# Patient Record
Sex: MALE | Race: WHITE
[De-identification: names, ages, dates, MRNs, and addresses within clinical notes are randomized per-mention and may not be internally consistent; named-entity substitution may affect disease eponyms.]

---

## 2020-03-21 ENCOUNTER — HOSPITAL ENCOUNTER (OUTPATIENT)
Dept: HOSPITAL 25 - ED | Age: 50
Setting detail: OBSERVATION
LOS: 1 days | Discharge: HOME | End: 2020-03-22
Attending: HOSPITALIST | Admitting: HOSPITALIST
Payer: MEDICARE

## 2020-03-21 DIAGNOSIS — R51: ICD-10-CM

## 2020-03-21 DIAGNOSIS — K92.2: Primary | ICD-10-CM

## 2020-03-21 DIAGNOSIS — Z86.010: ICD-10-CM

## 2020-03-21 DIAGNOSIS — N18.3: ICD-10-CM

## 2020-03-21 DIAGNOSIS — G80.9: ICD-10-CM

## 2020-03-21 DIAGNOSIS — R55: ICD-10-CM

## 2020-03-21 DIAGNOSIS — I12.9: ICD-10-CM

## 2020-03-21 DIAGNOSIS — Z88.8: ICD-10-CM

## 2020-03-21 LAB
ALBUMIN SERPL BCG-MCNC: 3.4 G/DL (ref 3.2–5.2)
ALBUMIN/GLOB SERPL: 1.3 {RATIO} (ref 1–3)
ALP SERPL-CCNC: 69 U/L (ref 34–104)
ALT SERPL W P-5'-P-CCNC: 15 U/L (ref 7–52)
ANION GAP SERPL CALC-SCNC: 4 MMOL/L (ref 2–11)
APTT PPP: 28 SECONDS (ref 26–38)
AST SERPL-CCNC: 12 U/L (ref 13–39)
BASOPHILS # BLD AUTO: 0 10^3/UL (ref 0–0.2)
BUN SERPL-MCNC: 32 MG/DL (ref 6–24)
BUN/CREAT SERPL: 19.6 (ref 8–20)
CALCIUM SERPL-MCNC: 8.1 MG/DL (ref 8.6–10.3)
CHLORIDE SERPL-SCNC: 109 MMOL/L (ref 101–111)
EOSINOPHIL # BLD AUTO: 0 10^3/UL (ref 0–0.6)
FOLATE SERPL-MCNC: 11.19 NG/ML (ref 3.99–?)
GLOBULIN SER CALC-MCNC: 2.7 G/DL (ref 2–4)
GLUCOSE SERPL-MCNC: 130 MG/DL (ref 70–100)
HCO3 SERPL-SCNC: 25 MMOL/L (ref 22–32)
HCT VFR BLD AUTO: 32 % (ref 42–52)
HCT VFR BLD AUTO: 34 % (ref 42–52)
HCT VFR BLD AUTO: 34 % (ref 42–52)
HGB BLD-MCNC: 11.1 G/DL (ref 14–18)
HGB BLD-MCNC: 11.3 G/DL (ref 14–18)
INR PPP/BLD: 1.1 (ref 0.82–1.09)
LYMPHOCYTES # BLD AUTO: 0.9 10^3/UL (ref 1–4.8)
MCH RBC QN AUTO: 32 PG (ref 27–31)
MCHC RBC AUTO-ENTMCNC: 34 G/DL (ref 31–36)
MCV RBC AUTO: 95 FL (ref 80–94)
MONOCYTES # BLD AUTO: 0.4 10^3/UL (ref 0–0.8)
NEUTROPHILS # BLD AUTO: 5.2 10^3/UL (ref 1.5–7.7)
NRBC # BLD AUTO: 0 10^3/UL
NRBC BLD QL AUTO: 0
PLATELET # BLD AUTO: 178 10^3/UL (ref 150–450)
POTASSIUM SERPL-SCNC: 3.9 MMOL/L (ref 3.5–5)
PROT SERPL-MCNC: 6.1 G/DL (ref 6.4–8.9)
RBC # BLD AUTO: 3.54 10^6 /UL (ref 4.18–5.48)
RBC # BLD AUTO: 3.55 10^6/UL (ref 4.18–5.48)
RETICULOCYTE PRODUCTION INDEX: 0.5 %
RETICULOCYTE PRODUCTION INDEX: 0.8 % (ref 0.5–1.5)
SODIUM SERPL-SCNC: 138 MMOL/L (ref 135–145)
WBC # BLD AUTO: 6.5 10^3/UL (ref 3.5–10.8)

## 2020-03-21 PROCEDURE — 80053 COMPREHEN METABOLIC PANEL: CPT

## 2020-03-21 PROCEDURE — 86850 RBC ANTIBODY SCREEN: CPT

## 2020-03-21 PROCEDURE — 85014 HEMATOCRIT: CPT

## 2020-03-21 PROCEDURE — 99157 MOD SED OTHER PHYS/QHP EA: CPT

## 2020-03-21 PROCEDURE — 93005 ELECTROCARDIOGRAM TRACING: CPT

## 2020-03-21 PROCEDURE — 99156 MOD SED OTH PHYS/QHP 5/>YRS: CPT

## 2020-03-21 PROCEDURE — 82746 ASSAY OF FOLIC ACID SERUM: CPT

## 2020-03-21 PROCEDURE — 85610 PROTHROMBIN TIME: CPT

## 2020-03-21 PROCEDURE — 82607 VITAMIN B-12: CPT

## 2020-03-21 PROCEDURE — 85018 HEMOGLOBIN: CPT

## 2020-03-21 PROCEDURE — 36415 COLL VENOUS BLD VENIPUNCTURE: CPT

## 2020-03-21 PROCEDURE — 86901 BLOOD TYPING SEROLOGIC RH(D): CPT

## 2020-03-21 PROCEDURE — 96372 THER/PROPH/DIAG INJ SC/IM: CPT

## 2020-03-21 PROCEDURE — 80048 BASIC METABOLIC PNL TOTAL CA: CPT

## 2020-03-21 PROCEDURE — 85730 THROMBOPLASTIN TIME PARTIAL: CPT

## 2020-03-21 PROCEDURE — 88305 TISSUE EXAM BY PATHOLOGIST: CPT

## 2020-03-21 PROCEDURE — 85045 AUTOMATED RETICULOCYTE COUNT: CPT

## 2020-03-21 PROCEDURE — 99285 EMERGENCY DEPT VISIT HI MDM: CPT

## 2020-03-21 PROCEDURE — 86900 BLOOD TYPING SEROLOGIC ABO: CPT

## 2020-03-21 PROCEDURE — G0378 HOSPITAL OBSERVATION PER HR: HCPCS

## 2020-03-21 PROCEDURE — 82270 OCCULT BLOOD FECES: CPT

## 2020-03-21 PROCEDURE — 85025 COMPLETE CBC W/AUTO DIFF WBC: CPT

## 2020-03-21 PROCEDURE — 96374 THER/PROPH/DIAG INJ IV PUSH: CPT

## 2020-03-21 RX ADMIN — CYANOCOBALAMIN TAB 500 MCG SCH MCG: 500 TAB at 12:58

## 2020-03-21 RX ADMIN — CARBAMAZEPINE SCH MG: 200 TABLET ORAL at 20:07

## 2020-03-21 RX ADMIN — CARBAMAZEPINE SCH MG: 200 TABLET ORAL at 10:38

## 2020-03-21 RX ADMIN — VERAPAMIL HYDROCHLORIDE SCH MG: 120 TABLET ORAL at 10:38

## 2020-03-21 NOTE — HP
CC:  Radha Lamb NP; Dr. Aakash Galloway; Dr. Kelvin Soriano *

 

ADMISSION HISTORY AND PHYSICAL:

 

DATE OF ADMISSION:  20

 

PRIMARY CARE PROVIDER:  Radha Lamb NP.

 

MY ATTENDING WHILE IN THE HOSPITAL:  Dr. Subha Maier.* (DICTATED BY FLORENCIO LIZAMA)

 

CONSULTING GASTROENTEROLOGIST:  Dr. Aakash Galloway.

 

GASTROENTEROLOGIST AT [facility], [place]:  Dr. Kelvin Soriano.

 

CHIEF COMPLAINT:  Gastrointestinal bleeding x4, up from 6 hours.

 

HISTORY OF PRESENT ILLNESS:  Mr. Vaughan is a 50-year-old male with past 
medical history significant for chronic kidney disease, cerebral palsy, 
hypertension, and headaches, who presents to the emergency department on  after he had a colonoscopy at which time a 15-mm polyp was removed from his 
ascending colon and 3 other moderate-sized polyps removed from his transverse 
colon.  The patient had no complications with the procedure, no signs of 
bleeding at that time.  This is the patient's first colonoscopy and was done 
for routine screening, not for any symptoms.  The patient has no history of 
premature colon cancer in his family.  The patient has not been otherwise 
feeling unwell.  The patient has had no history of GI bleeding.  He has had no 
history of weight loss.  The patient has taken no NSAIDs due to his kidneys, no 
other blood thinners.  The patient on Thursday, 20, had several small 
blood clots in his stool but did not think anything of this.  



However, this morning, on 20, the patient woke up at 4:30 in the morning 
with fecal urgency, went to the bathroom and had a large bloody bowel movement 
with anabella red blood and clots, after which he stood up and passed out, hitting 
his head on the wall.  He did not know how long he was out, but he stood up 
again and passed out again.  After the second time he passed out, he called his 
wife to activate EMS and was brought to the hospital emergently.  In the 
emergency department, the patient was found to be slightly anemic with a normal 
blood pressure and normal heart rate.  The patient had stool occult blood which 
was positive.  The patient's case was discussed with Dr. Aakash Galloway, 
Gastroenterology, who believes it is delayed postpolypectomy bleed, and we were 
asked to evaluate the patient for admission to the hospital.

 

PAST MEDICAL HISTORY:  Cerebral palsy, chronic headaches, hypertension, chronic 
kidney disease stage 3, recent diagnosis of colon polyps.

 

PAST SURGICAL HISTORY:  Left leg lengthening, right knee surgery after a trauma
, tonsillectomy.

 

MEDICATIONS:

1.  Tegretol 100 mg p.o. b.i.d.

2.  Losartan 50 mg p.o. daily.

3.  Verapamil 240 mg p.o. daily.

 

ALLERGIES:  LISINOPRIL.

 

FAMILY HISTORY:  The patient's father is alive and has diabetes and 
hypertension. The patient's mother  of renal failure.  The patient has a 
brother who is alive and well.

 

SOCIAL HISTORY:  The patient never smoked.  The patient drinks approximately 3 
times a year.  The patient used marijuana to manage his headaches but no longer 
does.  The patient works as a  at [employer] in [place].  The 
patient is  and has 1 biological child who is alive and 1 biological 
child who  at birth and 3 stepchildren.

 

REVIEW OF SYSTEMS:  A 10-point review of systems was reviewed, is negative 
except as above in the HPI and being positive for recent intentional weight 
loss on a ketogenic diet.

 

                               PHYSICAL EXAMINATION

 

GENERAL:  The patient is a 50-year-old male who appears stated age, sitting 
comfortably in the bed, in no acute distress.

 

VITAL SIGNS:  At the time of evaluation, temperature 97.4, pulse rate 71, 
respiratory rate 18, oxygen saturation 100% on room air, blood pressure 118/78.

 

HEENT:  Head normocephalic, atraumatic.  Sclerae anicteric.  No conjunctival 
injection.  Nasal mucosa moist.  Oral mucosa moist.  No pharyngeal erythema, 
discharge, or exudate.

 

NECK:  Supple, nontender.  No lymphadenopathy.  No carotid bruits.  No JVD.

 

RESPIRATORY:  Clear to auscultation bilaterally.  No wheezes, rales, or 
rhonchi. Good air exchange bilaterally.

 

CARDIAC:  Regular rate and rhythm.  No clicks, murmurs, gallops, or rubs.  
Pulses 2+ in the bilateral dorsalis pedis, posterior tibialis, and radial areas.

 

ABDOMEN:  Soft, nontender, nondistended.  Bowel sounds present, normoactive in 
all 4 quadrants.  No hepatosplenomegaly.  No abdominal bruits auscultated.  No 
hepatojugular reflux.

 

GENITOURINARY:  No suprapubic or CVA tenderness.

 

NEUROLOGIC:  Weakness and atrophy of the left lower extremity.

 

SKIN:  Clean, dry, and intact.  No rashes.

 

 DIAGNOSTIC STUDIES/LAB DATA:  White blood cell count 6.5, hemoglobin 11.3, 
platelet count 178.  INR 1.10, APTT 28.  Sodium 130, potassium 2.9, chloride 109
, carbon dioxide 25, anion gap 4, BUN 32, creatinine 1.63, glucose 130, calcium 
9.1. Bilirubin 0.3, AST 12, ALT 15, alkaline phosphatase 69.  Protein 6.1, 
albumin 3.4, globulin 2.7.

 

EKG shows normal sinus rhythm.  No ST segment elevation or depression.  No 
hypertrophy or enlargement.

 

ASSESSMENT AND PLAN:  Impression:  Mr. Vaughan is a 50-year-old male with past 
medical history significant for cerebral palsy, hypertension, chronic kidney 
disease, who presents to the emergency department 10 days after his colonoscopy 
with a large amount of bright red blood per rectum concerning for delayed 
postpolypectomy bleed.  The patient will be admitted to the hospital for close 
hemodynamic monitoring as well as gastroenterology consult and likely repeat 
colonoscopy.

 

1.  Lower gastrointestinal bleed likely delayed polypectomy bleed.  The patient 
had a 1.5 cm polyp removed from his ascending colon 10 days ago with tissue 
resection as well.  This is likely the cause of his bleeding.  The patient is 
on blood thinners.  The patient does not need transfusion at this time as his 
hemoglobin is 11.3, and he is not markedly hypotensive.  The patient will be 
monitored closely with serial H and H's and be transfused under 7.  The patient 
had 2 episodes of syncope this morning.  These were without any bowel movements 
and with acute blood loss.  The patient is on verapamil and losartan.  This is 
unlikely to be related to the patient's acute blood loss.  The patient was 
given a dose of pantoprazole while in the emergency department.  This will not 
be continued.  The patient will be prepped with 4 L GoLYTELY, 1 this evening 
and 1 tomorrow morning.  Further recommendation per Gastroenterology, Dr. Aakash Galloway, who will see the patient in consultation and do a colonoscopy tomorrow 
morning.

2.  Syncope.  The patient's syncope has no concerning findings for cardiogenic 
syncope nor any syncope mimics.  The patient likely had an enhanced vagal 
response from his relatively high dose of verapamil and his losartan.  These 
medications will be held.  The patient has been getting fluids.  The patient 
will be transfused as above if indicated.  The patient's EKG is not ischemic, 
no further assessment of his syncope is indicated at this time.

3.  Chronic kidney disease.  We have no history of the patient's creatinine 
except his statement that he has chronic kidney disease stage 3.  This very 
well could be his baseline.  We will hold his losartan.  The patient did not 
have an elevated BUN to creatinine ratio nor a significantly elevated BUN.  It 
is very unlikely this is related to an upper gastrointestinal bleed.

4.  History of palsy.  Supportive care.  The patient is very functional.

5.  Hypertension.  Hold losartan and reduce verapamil as above.

6.  DVT prophylaxis:  SCD for gastrointestinal bleeding and low risk.

7.  FEN:  The patient will have a clear liquid diet.  No further fluids.

 

TIME SPENT:  Approximately 60 minutes was spent on admission of this patient, 
30 of which was spent face-to-face with the patient, obtaining history and 
physical and discussing treatment plan.

 

The plan was discussed with my attending Dr. Subha Maier, and she is in 
agreement.

 

 ____________________________________ FLORENCIO LIZAMA

 

[id number] #: [id number]

[id number]

## 2020-03-21 NOTE — ED
GI/ HPI





- HPI Summary


HPI Summary: 





This patient is a 49yo M presenting to the ED with GI bleed since Thursday.  

States he had a colonoscopy on March 11 in [facility].  This was a 

scheduled colon cancer screening colonoscopy and pt had no symptoms prior to 

this.   Denied any symptoms until Thursday, March 19 in which he had BRBPR with 

stool during a BM.  He also noticed this the following day, Friday, but states 

it was much less.  Denied any pain at the anus or abdominal pain.  Denied any n/

v.  This AM, patient states there was a large amount of some black stool but 

mostly bright red blood with a very small bowel movement this morning.  While 

sitting on the toilet, patient had a syncopal episode and struck the L side of 

his head.  He is unsure how long he had LOC.  Immediately following getting up, 

he had another syncopal episode and called EMS.  States he feels well now.  








Per patient, he had a few polyps removed, but otherwise colonoscopy was 

unremarkable with no complications.  





During syncopal episodes, pt denied diaphoresis, n/v, abd pain or visual 

changes.  No hx of syncope.  


Hx includes HTN and migraines for which he takes losartan and carbamazepime.  





Denies HA currently.  Denies photophobia, visual changes.  Denies any blood 

thinners. 











- History of Current Complaint


Chief Complaint: EDGIBleed


Time Seen by Provider: 03/21/20 06:44


Stated Complaint: RECTAL BLEED PER EMS


Hx Obtained From: Patient


Timing: Constant


Severity: Mild


Current Severity: Mild


Pain Intensity: 0


Associated Signs and Symptoms: Positive: Bright Red Blood w/Stool.  Negative: 

Hematemesis, Back Pain, Pallor, Dizziness, External Hemorrhoid, Lightheadedness

, Cough, Chest Pain


Aggravating Factor(s): Nothing


Alleviating Factor(s): Nothing





- Allergy/Home Medications


Allergies/Adverse Reactions: 


 Allergies











Allergy/AdvReac Type Severity Reaction Status Date / Time


 


lisinopril Allergy  Coughing Verified 03/21/20 06:45











Home Medications: 


 Home Medications





Losartan Potassium [Cozaar] 50 mg PO DAILY 03/21/20 [History Confirmed 03/21/20]


Verapamil TAB* [Calan TAB*] 240 mg PO BID 03/21/20 [History Confirmed 03/21/20]


carBAMazepine TAB(*) [TEGretol TAB(*)] 100 mg PO BID 03/21/20 [History 

Confirmed 03/21/20]











PMH/Surg Hx/FS Hx/Imm Hx


Previously Healthy: Yes





- Immunization History


Hx Pertussis Vaccination: No


Immunizations Up to Date: Yes


Infectious Disease History: No


Infectious Disease History: 


   Denies: Traveled Outside the [place] in Last 30 Days





- Social History


Occupation: Employed Full-time


Lives: With Family


Alcohol Use: Rare


Hx Substance Use: No


Substance Use Type: Reports: None


Smoking Status (MU): Never Smoked Tobacco





Review of Systems


Negative: Fever, Chills, Fatigue, Skin Diaphoresis


Negative: Palpitations, Chest Pain


Negative: Shortness Of Breath, Cough


Genitourinary: Negative


Positive: no symptoms reported, see HPI


All Other Systems Reviewed And Are Negative: Yes





Physical Exam


Triage Information Reviewed: Yes


Vital Signs On Initial Exam: 


 Initial Vitals











Resp BP


 


 17   112/78 


 


 03/21/20 06:35  03/21/20 06:35











Vital Signs Reviewed: Yes


Appearance: Positive: Well-Appearing, Well-Nourished


Skin: Positive: Warm, Skin Color Reflects Adequate Perfusion


Head/Face: Positive: Normal Head/Face Inspection


Eyes: Positive: EOMI, YASMIN, Conjunctiva Clear


Neck: Positive: Supple, No Lymphadenopathy


Respiratory/Lung Sounds: Positive: Clear to Auscultation, Breath Sounds Present


Cardiovascular: Positive: RRR, Pulses are Symmetrical in both Upper and Lower 

Extremities


Musculoskeletal: Positive: Strength/ROM Intact


Neurological: Positive: Sensory/Motor Intact


Psychiatric: Positive: Affect/Mood Appropriate


AVPU Assessment: Alert





Procedures





- Sedation


Patient Received Moderate/Deep Sedation with Procedure: No





Diagnostics





- Vital Signs


 Vital Signs











  Temp Pulse Resp BP Pulse Ox


 


 03/21/20 08:05   62  14  119/85  99


 


 03/21/20 08:00   69  18   100


 


 03/21/20 07:35   64  2  113/76  99


 


 03/21/20 07:05   59  17  101/76  94


 


 03/21/20 07:00    0  


 


 03/21/20 06:51    6  


 


 03/21/20 06:40  97.4 F  62  18  112/78  96


 


 03/21/20 06:35    17  112/78 














- Laboratory


Lab Results: 


 Lab Results











  03/21/20 03/21/20 03/21/20 Range/Units





  07:02 07:02 07:02 


 


WBC  6.5    (3.5-10.8)  10^3/uL


 


RBC  3.54 L    (4.18-5.48)  10^6 /uL


 


Hgb  11.3 L    (14.0-18.0)  g/dL


 


Hct  34 L    (42-52)  %


 


MCV  95 H    (80-94)  fL


 


MCH  32 H    (27-31)  pg


 


MCHC  34    (31-36)  g/dL


 


RDW  15    (10-15)  %


 


Plt Count  178    (150-450)  10^3/uL


 


MPV  7.8    (7.4-10.4)  fL


 


Neut % (Auto)  78.8    %


 


Lymph % (Auto)  13.4    %


 


Mono % (Auto)  6.4    %


 


Eos % (Auto)  0.8    %


 


Baso % (Auto)  0.6    %


 


Absolute Neuts (auto)  5.2    (1.5-7.7)  10^3/ul


 


Absolute Lymphs (auto)  0.9 L    (1.0-4.8)  10^3/ul


 


Absolute Monos (auto)  0.4    (0-0.8)  10^3/ul


 


Absolute Eos (auto)  0.0    (0-0.6)  10^3/ul


 


Absolute Basos (auto)  0.0    (0-0.2)  10^3/ul


 


Absolute Nucleated RBC  0.0    10^3/ul


 


Nucleated RBC %  0.0    


 


INR (Anticoag Therapy)   1.10 H   (0.82-1.09)  


 


APTT   28.0   (26.0-38.0)  seconds


 


Sodium    138  (135-145)  mmol/L


 


Potassium    3.9  (3.5-5.0)  mmol/L


 


Chloride    109  (101-111)  mmol/L


 


Carbon Dioxide    25  (22-32)  mmol/L


 


Anion Gap    4  (2-11)  mmol/L


 


BUN    32 H  (6-24)  mg/dL


 


Creatinine    1.63 H  (0.67-1.17)  mg/dL


 


Est GFR ( Amer)    54.5  (>60)  


 


Est GFR (Non-Af Amer)    45.0  (>60)  


 


BUN/Creatinine Ratio    19.6  (8-20)  


 


Glucose    130 H  ()  mg/dL


 


Calcium    8.1 L  (8.6-10.3)  mg/dL


 


Total Bilirubin    0.30  (0.2-1.0)  mg/dL


 


AST    12 L  (13-39)  U/L


 


ALT    15  (7-52)  U/L


 


Alkaline Phosphatase    69  ()  U/L


 


Total Protein    6.1 L  (6.4-8.9)  g/dL


 


Albumin    3.4  (3.2-5.2)  g/dL


 


Globulin    2.7  (2-4)  g/dL


 


Albumin/Globulin Ratio    1.3  (1-3)  


 


Blood Type     


 


Antibody Screen     














  03/21/20 Range/Units





  07:02 


 


WBC   (3.5-10.8)  10^3/uL


 


RBC   (4.18-5.48)  10^6 /uL


 


Hgb   (14.0-18.0)  g/dL


 


Hct   (42-52)  %


 


MCV   (80-94)  fL


 


MCH   (27-31)  pg


 


MCHC   (31-36)  g/dL


 


RDW   (10-15)  %


 


Plt Count   (150-450)  10^3/uL


 


MPV   (7.4-10.4)  fL


 


Neut % (Auto)   %


 


Lymph % (Auto)   %


 


Mono % (Auto)   %


 


Eos % (Auto)   %


 


Baso % (Auto)   %


 


Absolute Neuts (auto)   (1.5-7.7)  10^3/ul


 


Absolute Lymphs (auto)   (1.0-4.8)  10^3/ul


 


Absolute Monos (auto)   (0-0.8)  10^3/ul


 


Absolute Eos (auto)   (0-0.6)  10^3/ul


 


Absolute Basos (auto)   (0-0.2)  10^3/ul


 


Absolute Nucleated RBC   10^3/ul


 


Nucleated RBC %   


 


INR (Anticoag Therapy)   (0.82-1.09)  


 


APTT   (26.0-38.0)  seconds


 


Sodium   (135-145)  mmol/L


 


Potassium   (3.5-5.0)  mmol/L


 


Chloride   (101-111)  mmol/L


 


Carbon Dioxide   (22-32)  mmol/L


 


Anion Gap   (2-11)  mmol/L


 


BUN   (6-24)  mg/dL


 


Creatinine   (0.67-1.17)  mg/dL


 


Est GFR ( Amer)   (>60)  


 


Est GFR (Non-Af Amer)   (>60)  


 


BUN/Creatinine Ratio   (8-20)  


 


Glucose   ()  mg/dL


 


Calcium   (8.6-10.3)  mg/dL


 


Total Bilirubin   (0.2-1.0)  mg/dL


 


AST   (13-39)  U/L


 


ALT   (7-52)  U/L


 


Alkaline Phosphatase   ()  U/L


 


Total Protein   (6.4-8.9)  g/dL


 


Albumin   (3.2-5.2)  g/dL


 


Globulin   (2-4)  g/dL


 


Albumin/Globulin Ratio   (1-3)  


 


Blood Type  B Positive  


 


Antibody Screen  Negative  











Result Diagrams: 


 03/21/20 07:02





 03/21/20 07:02


Lab Statement: Any lab studies that have been ordered have been reviewed, and 

results considered in the medical decision making process.





GIGU Course/Dx





- Course


Course Of Treatment: On physical examination, patient appears well.  He is 

nondiaphoretic and does not appear to be pale.  He denies any pain at this 

time.  Lungs CTA, RRR.  Patient states he does not feel dizzy or lightheaded at 

this time, however had 2 syncopal episodes this morning.   Labs obtained: H&H 

shows 11.3 and 34.  UDAY: large amount of BRB with blackened stool near anus 

without evidence of hemorrhoids.  Stool occult sent.  Requested information 

from GI Dr. Sherman at [facility].  Discussed case with Dr. Galloway.  

Patient given fluids and protonix.  Likely delayed postpolypectomy bleeding.  

Will assess further.   Dr. Galloway will see patient and consult.  Pt 

hemodynamically stable.





- Diagnoses


Provider Diagnoses: 


 GI bleed








Discharge ED





- Sign-Out/Discharge


Documenting (check all that apply): Patient Departure





- Discharge Plan


Condition: Fair


Disposition: ADMITTED TO [facility]


Referrals: 


No Primary Care Phys,NOPCP [Primary Care Provider] - 





- Billing Disposition and Condition


Condition: FAIR


Disposition: Admitted to [facility]

## 2020-03-21 NOTE — CONS
CONSULTATION REPORT:

 

DATE OF CONSULT:  03/21/20

 

REQUESTING PROVIDER:  FLORENCIO Thomas.

 

INDICATION FOR CONSULTATION:  Rectal bleeding.

 

HISTORY OF PRESENT ILLNESS:  This is a very pleasant 50-year-old male with a 
past medical history of chronic headaches, who presented to the emergency room 
with rectal bleeding.  He did have a colonoscopy done at [facility] in [place] on 03
/11/20. He states he felt well after the procedure.  Denies any bleeding.  A 
review of the report does show that he had a 1.5 cm polyp in the ascending 
colon that was removed with electrocautery.  Clips were placed over this, and 3 
smaller 6 to 9 mm polyps were removed in the transverse colon, ascending colon, 
and cecum.  These were also removed with hot snare polypectomy.  The diminutive 
polyp was removed in the transverse colon with jumbo cold biopsy forceps.  For 
the next few days, he did well after his procedure.  He states that on Thursday 
he had several small clots in his stool and this continued for the next few days
, but on the morning of 03/21/20, he woke up at 4 and had a very large dark 
brown maroon colored stool with anabella blood.  He states that he nearly passed 
out while in the restroom and afterwards his wife called EMS and he was brought 
to the emergency room for further evaluation.  He currently denies any 
abdominal pain.  He has not had a bowel movement since being admitted to the 
floor here.  He denies any black in the stool. Denies any weight loss or weight 
gain.  Denies any dysphagia or odynophagia. Denies any nausea or emesis.  He 
denies any complications with anesthesia from the previous endoscopy.  The 
patient was admitted for evaluation of a postpolypectomy bleed.  The remainder 
of the 14-point review of systems grossly negative except for as described in 
the HPI.

 

PAST MEDICAL HISTORY:  Chronic kidney disease, he avoids NSAIDs because of this
; chronic headaches; hypertension; cerebral palsy; and aforementioned 
colonoscopy.

 

PAST SURGICAL HISTORY:  Knee surgery, tonsillectomy, colonoscopy on 03/11/20 by 
Dr. Kelvin Soriano in [place].

 

MEDICATIONS:  Home medications include:

1.  Tegretol.

2.  Losartan.

3.  Verapamil.

 

ALLERGIES:  LISINOPRIL.

 

FAMILY HISTORY:  Denies any family history of GI cancer or inflammatory bowel 
disease.

 

SOCIAL HISTORY:  Nonsmoker.  Rare alcohol use.

 

REVIEW OF SYSTEMS:  Remainder of the 14-point review of systems is grossly 
negative except for as described in the HPI.

 

PHYSICAL EXAM:  Vital Signs:  Blood pressure is 138/90, pulse is 91, 
respiratory rate is 18, temperature max is 97.8, he is 100% on room air.  In 
general, alert and oriented x3, in no acute distress.  HEENT:  Atraumatic, 
normocephalic.  Pupils equal, round, reactive to light.  Extraocular movement 
intact.  Conjunctivae are pink.  Sclerae anicteric.  Cardiovascular:  Regular 
rate and rhythm.  S1, S2. Respiratory:  Clear to auscultation bilaterally.  
Abdomen:  Obese, soft, nontender, nondistended.  Bowel sounds positive. No 
guarding or rebound.  Extremities:  No clubbing, no cyanosis, no edema.  Psych:
  Appropriate mood and affect.  Skin:  A few tattoos.

 

DIAGNOSTIC STUDIES/LAB DATA:  Hemoglobin 11.3, I do not have a previous 
baseline. INR 1.10.  BUN 32, creatinine 1.63.  AST is 12, ALT is 15, bilirubin 
is 0.30.

 

ASSESSMENT:  A 50-year-old male with acute blood loss anemia secondary to 
probable postpolypectomy bleed.

 

1.  Acute blood loss anemia.  Monitor H and H per primary team.  Discussed the 
risks, benefits, and alternatives with the risks not limited to perforation, 
surgery, missed lesions, and/or death with the patient and we will plan for 
colonoscopy evaluation with hemostasis on 03/22/20.  He is currently 
hemodynamically stable.  Continue to avoid NSAIDs on a long-term basis.

2.  History of colon polyps with recent polypectomy.  I reviewed the previous 
report.  Multiple polyps were taken off with hot snare polypectomy, which can 
lead to a delayed onset of bleeding.  We will plan on colonoscopy to evaluate.

 

 [id number]/CPS #: [id number]

[facility]

## 2020-03-22 VITALS — DIASTOLIC BLOOD PRESSURE: 83 MMHG | SYSTOLIC BLOOD PRESSURE: 141 MMHG

## 2020-03-22 LAB
ANION GAP SERPL CALC-SCNC: 4 MMOL/L (ref 2–11)
BASOPHILS # BLD AUTO: 0 10^3/UL (ref 0–0.2)
BUN SERPL-MCNC: 16 MG/DL (ref 6–24)
BUN/CREAT SERPL: 10.1 (ref 8–20)
CALCIUM SERPL-MCNC: 8.8 MG/DL (ref 8.6–10.3)
CHLORIDE SERPL-SCNC: 108 MMOL/L (ref 101–111)
EOSINOPHIL # BLD AUTO: 0.1 10^3/UL (ref 0–0.6)
GLUCOSE SERPL-MCNC: 121 MG/DL (ref 70–100)
HCO3 SERPL-SCNC: 27 MMOL/L (ref 22–32)
HCT VFR BLD AUTO: 34 % (ref 42–52)
HGB BLD-MCNC: 11.5 G/DL (ref 14–18)
LYMPHOCYTES # BLD AUTO: 1.3 10^3/UL (ref 1–4.8)
MCH RBC QN AUTO: 32 PG (ref 27–31)
MCHC RBC AUTO-ENTMCNC: 34 G/DL (ref 31–36)
MCV RBC AUTO: 95 FL (ref 80–94)
MONOCYTES # BLD AUTO: 0.3 10^3/UL (ref 0–0.8)
NEUTROPHILS # BLD AUTO: 2.7 10^3/UL (ref 1.5–7.7)
NRBC # BLD AUTO: 0 10^3/UL
NRBC BLD QL AUTO: 0
PLATELET # BLD AUTO: 193 10^3/UL (ref 150–450)
POTASSIUM SERPL-SCNC: 3.6 MMOL/L (ref 3.5–5)
RBC # BLD AUTO: 3.56 10^6 /UL (ref 4.18–5.48)
SODIUM SERPL-SCNC: 139 MMOL/L (ref 135–145)
WBC # BLD AUTO: 4.4 10^3/UL (ref 3.5–10.8)

## 2020-03-22 RX ADMIN — CARBAMAZEPINE SCH MG: 200 TABLET ORAL at 10:07

## 2020-03-22 RX ADMIN — VERAPAMIL HYDROCHLORIDE SCH MG: 120 TABLET ORAL at 10:07

## 2020-03-22 RX ADMIN — CYANOCOBALAMIN TAB 500 MCG SCH MCG: 500 TAB at 10:07

## 2020-03-22 NOTE — DS
CC:  Radha Lamb NP, [facility] *

 

DISCHARGE SUMMARY:

 

DATE OF ADMISSION:  03/21/20

 

DATE OF DISCHARGE:  03/22/20

 

ATTENDING PHYSICIAN:  Dr. Maier.* (DICTATED BY GENNARO CORDOBA NP)

 

PRIMARY CARE PROVIDER:  Radha Lamb NP, from [facility].

 

CONSULTING PHYSICIAN:  Dr. Galloway from Gastroenterology.

 

PRIMARY DIAGNOSES:

1.  Lower gastrointestinal bleeding.

2.  Syncope.

 

SECONDARY DIAGNOSES:

1.  Hypertension.

2.  Chronic kidney disease, stage 3.

3.  Cerebral palsy.

 

PROCEDURE:  Colonoscopy with Dr. Galloway on 03/22/20.

 

HISTORY OF PRESENT ILLNESS AND HOSPITAL COURSE:  Mr. Vaughan is a 50-year-old 
male who presented to the emergency department on 03/21/20 via EMS after 
reporting small blood clots in his stool on 03/19/20 and then on the morning of 
03/21/20 woke up at 4:30 in the morning with fecal urgency and had a large 
bloody bowel movement with anabella red blood and multiple clots, after which he 
stood up and passed out, hit his head in the frontal region and then had 
another episode of passing out after that prior to EMS being called.  He does 
have a history of colonoscopy with polypectomy on 03/11/20.  His H and H on 
arrival were 11.3 and 34 and had remained stable throughout his stay.  GI was 
consulted and planned to do a colonoscopy on 03/22/20. The patient did well 
with bowel prep this morning, had a colonoscopy with Dr. Galloway.  He had clips 
placed as well as polyp removal.  No further bleeding noted during colonoscopy 
and the patient was acceptable for discharge per GI.  His vital signs have 
remained stable throughout his stay.  He was noted to have a creatinine of 1.63 
and then 1.58, and an estimated GFR of 45 and 46.7. With arrival, BUN of 32; 
however, it was 16 this morning.  The patient did report that he has chronic 
kidney disease stage 3, which would be consistent with these findings.  There 
were no prior records to compare these values to.  Plan was to continue with 
liquid diet today and can slowly progress to his usual diet from tomorrow.  The 
patient currently denies any headaches, lightheadedness, dizziness, chest pain, 
palpitations, shortness of breath, abdominal discomfort, nausea, vomiting, 
unusual numbness, tingling, or swelling.  States he did have a soft BM this 
morning with dark maroonish stool.  The patient states size difference in 
calves is usual for him in relation to his CP.

 

DIAGNOSTIC STUDIES/LAB DATA:  EKG on arrival showed sinus bradycardia at 56 
beats per minute.  No noted ST elevations or depressions.

 

Most recent lab data:  WBC 4.4, RBC 3.56, hemoglobin 11.5, hematocrit 34, MCV 95
, MCH 32, MCHC 34, RDW 15, platelet count 193.  INR 1.10, APTT 28.0.  Sodium 139
, potassium 3.6, chloride 108, carbon dioxide 27, anion gap 4, BUN 16, 
creatinine 1.58, estimated GFR 46.7, BUN/creatinine ratio 10.1, glucose 121, 
calcium 8.8. Total bilirubin 0.30, AST 12, ALT 15, alk phosphatase 69.  Total 
protein 6.1, albumin 3.4, globulin 2.7, albumin/globulin ratio 1.3.  Vitamin 
B12 121, folate 11.19.  Stool occult blood positive.

 

REVIEW OF SYSTEMS:  A 12-point review of systems was completed with this 
patient. Please see HPI for all pertinent positives and negatives.

 

PHYSICAL EXAMINATION:  Constitutional:  The patient is lying in bed, in no 
acute distress.  Last Vital Signs:  Temp 97, heart rate 98, respiratory rate 18
, O2 sat 100%, /83.  HEENT:  PERRL. No scleral icterus noted.  
Cardiovascular:  Heart rate regular.  S1, S2 present.  No murmurs, rubs, or 
gallops noted.  Extremities: Right calf is markedly larger than left calf.  No 
edema.  No erythema or pain. Pedal pulses present 2+ bilaterally.  Respiratory:
  Lung sounds clear throughout bilaterally.  Normal respiratory effort.  GI:  
Abdomen is large.  Normoactive bowel sounds throughout.  Soft, nontender.  
Musculoskeletal:  Range of motion and strength within normal limits to all 
extremities.  Skin appears dry and intact. Neuro:  Alert and oriented x3.

 

DISCHARGE PLAN:

1.  Will continue with liquid diet today.  Will slowly increase to regular diet 
starting tomorrow.

2.  No equipment necessary for discharge.

3.  Can resume usual activity level as tolerated.  Should slowly increase over 
the next day or two.

4.  Lower GI bleeding.  This was thought to be most likely a delayed bleed 
status post colonoscopy and polypectomy from 03/11/20.  He did have a 
colonoscopy done today.  Plan is to follow up with another colonoscopy in 1 
year with GI.  He should be following up with his primary care provider in the 
next 1 to 2 weeks.

5.  Syncope.  Likely in relation to acute blood loss and has not had any 
further episodes.  If this happens again, he should return to the ED.

6.  Chronic kidney disease, stage 3.  Consistent with laboratory values 
obtained during this visit.  Should follow up with his PCP about this condition.

7.  Hypertension.  The patient may resume usual medications when he gets home 
and follow up with PCP.

8.  Return precautions:  Return to the ED or call 911 with any further 
lightheadedness, palpitations, syncope, significant bleeding, chest pain, or 
unusual shortness of breath.

 

MEDICATIONS AT DISCHARGE:

1.  Carbamazepine 100 mg p.o. b.i.d.

2.  Losartan 50 mg p.o. daily.

3.  Verapamil 240 mg p.o. daily.

 

CONDITION AT DISCHARGE:  Stable.

 

DISPOSITION:  Home.

 

TIME SPENT:  Approximately 45 minutes was spent on this discharge with about 
half of that being spent face-to-face with the patient for interview, exam, and 
reviewing plan of care and discharge plan.

 

This case has been reviewed by my attending physician, Dr. Maier, and she 
agrees with this plan.

 

____________________________________ GENNARO CORDOBA NP

 

[id number]/CPS #: [id number]

HOOD

## 2020-03-22 NOTE — PN
Progress Note





- Progress Note


Date of Service: 03/22/20


Note: 





GI Brief Colonoscopy Note





Colon to TI fair to poor prep





Ascending colon polyp site ulcer with red spot, ooze. Clip x3 placed and bicap 

at 20W with excellent effect. No further bleeding





Ascending colon polyp 0.3 cold forcep removal. no bleeding post





Mid transverse colon vessel with clot, clip placed over with excellent effect. 

no bleeding.





TV polyp cold snare 0.5cm cold snare removal. no bleeding post





descend polyp cold snare 0.6cm removal. no bleeding post





Lots of seeds and debris but at conclusion of procedure no active bleeding





Rec:


Full liquids today


Ok to d/c


Regular diet tmrw. 


Colon 1 year. 


Return to hospital if bleeding.





Aakash Galloway DO 3/22/20 [id number]